# Patient Record
Sex: MALE | Race: WHITE | NOT HISPANIC OR LATINO | URBAN - METROPOLITAN AREA
[De-identification: names, ages, dates, MRNs, and addresses within clinical notes are randomized per-mention and may not be internally consistent; named-entity substitution may affect disease eponyms.]

---

## 2020-07-25 ENCOUNTER — TELEPHONE ENCOUNTER (OUTPATIENT)
Dept: URBAN - METROPOLITAN AREA CLINIC 13 | Facility: CLINIC | Age: 75
End: 2020-07-25

## 2020-07-25 RX ORDER — POLYETHYLENE GLYCOL 3350, SODIUM CHLORIDE, SODIUM BICARBONATE AND POTASSIUM CHLORIDE WITH LEMON FLAVOR 420; 11.2; 5.72; 1.48 G/4L; G/4L; G/4L; G/4L
TAKE 1/2 GALLON AT 5:00 PM DAY BEFORE PROCEDURE, TAKE SECOND 1/2 OF GALLON 6 HRS PRIOR TO PROCEDURE POWDER, FOR SOLUTION ORAL
Qty: 1 | Refills: 0 | OUTPATIENT
Start: 2017-05-15 | End: 2017-06-07

## 2020-07-26 ENCOUNTER — TELEPHONE ENCOUNTER (OUTPATIENT)
Dept: URBAN - METROPOLITAN AREA CLINIC 13 | Facility: CLINIC | Age: 75
End: 2020-07-26

## 2020-07-26 RX ORDER — ATORVASTATIN CALCIUM 80 MG/1
TAKE 1 TABLET AT BEDTIME TABLET, FILM COATED ORAL
Refills: 0 | Status: ACTIVE | COMMUNITY
Start: 2017-05-08

## 2020-07-26 RX ORDER — MELOXICAM 15 MG/1
TABLET ORAL
Qty: 30 | Refills: 0 | Status: ACTIVE | COMMUNITY
Start: 2017-01-06

## 2020-07-26 RX ORDER — ASPIRIN 81 MG/1
TAKE 1 TABLET DAILY TABLET ORAL
Refills: 0 | Status: ACTIVE | COMMUNITY
Start: 2017-05-08

## 2020-07-26 RX ORDER — CLOPIDOGREL 75 MG/1
TAKE 1 TABLET DAILY TABLET ORAL
Refills: 0 | Status: ACTIVE | COMMUNITY
Start: 2017-05-08

## 2020-07-26 RX ORDER — NAPROXEN 500 MG/1
TABLET ORAL
Qty: 60 | Refills: 0 | Status: ACTIVE | COMMUNITY
Start: 2016-07-16

## 2020-07-26 RX ORDER — DEXAMETHASONE 4 MG/1
TABLET ORAL
Qty: 14 | Refills: 0 | Status: ACTIVE | COMMUNITY
Start: 2017-04-10

## 2020-07-26 RX ORDER — FUROSEMIDE 20 MG/1
TABLET ORAL
Qty: 30 | Refills: 0 | Status: ACTIVE | COMMUNITY
Start: 2016-10-01

## 2020-07-26 RX ORDER — CEPHALEXIN 500 MG/1
CAPSULE ORAL
Qty: 40 | Refills: 0 | Status: ACTIVE | COMMUNITY
Start: 2017-01-04

## 2020-07-26 RX ORDER — MONTELUKAST SODIUM 10 MG/1
TAKE 1 TABLET AT BEDTIME TABLET, FILM COATED ORAL
Refills: 0 | Status: ACTIVE | COMMUNITY
Start: 2017-05-08

## 2020-07-26 RX ORDER — DUREZOL 0.5 MG/ML
EMULSION OPHTHALMIC
Qty: 5 | Refills: 0 | Status: ACTIVE | COMMUNITY
Start: 2016-11-11

## 2020-07-26 RX ORDER — CLINDAMYCIN HYDROCHLORIDE 300 MG/1
CAPSULE ORAL
Qty: 30 | Refills: 0 | Status: ACTIVE | COMMUNITY
Start: 2017-02-13

## 2020-07-26 RX ORDER — MULTIVITAMIN
TAKE 1 TABLET DAILY TABLET ORAL
Refills: 0 | Status: ACTIVE | COMMUNITY
Start: 2017-05-08

## 2020-07-26 RX ORDER — OMEGA-3S/DHA/EPA/FISH OIL/D3 300MG-1000
TAKE 1 CAPSULE DAILY CAPSULE ORAL
Refills: 0 | Status: ACTIVE | COMMUNITY
Start: 2017-05-08

## 2020-07-26 RX ORDER — DICLOFENAC SODIUM 75 MG/1
TABLET, DELAYED RELEASE ORAL
Qty: 60 | Refills: 0 | Status: ACTIVE | COMMUNITY
Start: 2016-06-14

## 2020-07-26 RX ORDER — CYCLOBENZAPRINE HYDROCHLORIDE 5 MG/1
TABLET, FILM COATED ORAL
Qty: 10 | Refills: 0 | Status: ACTIVE | COMMUNITY
Start: 2017-04-10

## 2020-07-26 RX ORDER — NITROGLYCERIN 0.4 MG/1
DISSOLVE 1 TABLET UNDER THE TONGUE AS NEEDED FOR CHEST PAIN TABLET SUBLINGUAL
Refills: 0 | Status: ACTIVE | COMMUNITY
Start: 2017-05-08

## 2020-07-26 RX ORDER — LOSARTAN POTASSIUM 50 MG/1
TAKE 1 TABLET DAILY TABLET, FILM COATED ORAL
Refills: 0 | Status: ACTIVE | COMMUNITY
Start: 2017-05-08

## 2020-07-26 RX ORDER — HYDROCODONE BITARTRATE AND ACETAMINOPHEN 10; 325 MG/1; MG/1
TABLET ORAL
Qty: 51 | Refills: 0 | Status: ACTIVE | COMMUNITY
Start: 2016-08-26

## 2020-07-26 RX ORDER — GUAIFENESIN 600 MG/1
TAKE 1 TABLET TWICE DAILY AS NEEDED TABLET, EXTENDED RELEASE ORAL
Refills: 0 | Status: ACTIVE | COMMUNITY
Start: 2017-05-08

## 2020-07-26 RX ORDER — BESIFLOXACIN HCL 0.6 %
SUSPENSION, DROPS(FINAL DOSAGE FORM)(ML) OPHTHALMIC (EYE)
Qty: 5 | Refills: 0 | Status: ACTIVE | COMMUNITY
Start: 2016-11-10

## 2022-06-23 ENCOUNTER — DASHBOARD ENCOUNTERS (OUTPATIENT)
Age: 77
End: 2022-06-23

## 2022-06-23 ENCOUNTER — WEB ENCOUNTER (OUTPATIENT)
Dept: URBAN - METROPOLITAN AREA CLINIC 113 | Facility: CLINIC | Age: 77
End: 2022-06-23

## 2022-06-23 ENCOUNTER — OFFICE VISIT (OUTPATIENT)
Dept: URBAN - METROPOLITAN AREA CLINIC 113 | Facility: CLINIC | Age: 77
End: 2022-06-23
Payer: MEDICARE

## 2022-06-23 VITALS — HEIGHT: 72 IN | RESPIRATION RATE: 20 BRPM | WEIGHT: 184 LBS | TEMPERATURE: 97.8 F | BODY MASS INDEX: 24.92 KG/M2

## 2022-06-23 DIAGNOSIS — R10.31 RIGHT INGUINAL PAIN: ICD-10-CM

## 2022-06-23 DIAGNOSIS — Z86.010 HISTORY OF COLON POLYPS: ICD-10-CM

## 2022-06-23 PROCEDURE — 99203 OFFICE O/P NEW LOW 30 MIN: CPT

## 2022-06-23 RX ORDER — CEPHALEXIN 500 MG/1
CAPSULE ORAL
Qty: 40 | Refills: 0 | Status: ON HOLD | COMMUNITY
Start: 2017-01-04

## 2022-06-23 RX ORDER — CLINDAMYCIN HYDROCHLORIDE 300 MG/1
CAPSULE ORAL
Qty: 30 | Refills: 0 | Status: ON HOLD | COMMUNITY
Start: 2017-02-13

## 2022-06-23 RX ORDER — DUREZOL 0.5 MG/ML
EMULSION OPHTHALMIC
Qty: 5 | Refills: 0 | Status: ON HOLD | COMMUNITY
Start: 2016-11-11

## 2022-06-23 RX ORDER — DEXAMETHASONE 4 MG/1
TABLET ORAL
Qty: 14 | Refills: 0 | Status: ON HOLD | COMMUNITY
Start: 2017-04-10

## 2022-06-23 RX ORDER — BESIFLOXACIN HCL 0.6 %
SUSPENSION, DROPS(FINAL DOSAGE FORM)(ML) OPHTHALMIC (EYE)
Qty: 5 | Refills: 0 | Status: ON HOLD | COMMUNITY
Start: 2016-11-10

## 2022-06-23 RX ORDER — GUAIFENESIN 600 MG/1
TAKE 1 TABLET TWICE DAILY AS NEEDED TABLET, EXTENDED RELEASE ORAL
Refills: 0 | Status: ON HOLD | COMMUNITY
Start: 2017-05-08

## 2022-06-23 RX ORDER — OMEGA-3S/DHA/EPA/FISH OIL/D3 300MG-1000
TAKE 1 CAPSULE DAILY CAPSULE ORAL
Refills: 0 | Status: ACTIVE | COMMUNITY
Start: 2017-05-08

## 2022-06-23 RX ORDER — ATORVASTATIN CALCIUM 80 MG/1
TAKE 1 TABLET AT BEDTIME TABLET, FILM COATED ORAL
Refills: 0 | Status: ACTIVE | COMMUNITY
Start: 2017-05-08

## 2022-06-23 RX ORDER — LOSARTAN POTASSIUM 50 MG/1
TAKE 1 TABLET DAILY TABLET, FILM COATED ORAL
Refills: 0 | Status: ACTIVE | COMMUNITY
Start: 2017-05-08

## 2022-06-23 RX ORDER — NITROGLYCERIN 0.4 MG/1
AS DIRECTED TABLET SUBLINGUAL
Status: ACTIVE | COMMUNITY

## 2022-06-23 RX ORDER — MELOXICAM 15 MG/1
TABLET ORAL
Qty: 30 | Refills: 0 | Status: ON HOLD | COMMUNITY
Start: 2017-01-06

## 2022-06-23 RX ORDER — NITROGLYCERIN 0.4 MG/1
DISSOLVE 1 TABLET UNDER THE TONGUE AS NEEDED FOR CHEST PAIN TABLET SUBLINGUAL
Refills: 0 | Status: ON HOLD | COMMUNITY
Start: 2017-05-08

## 2022-06-23 RX ORDER — HYDROCODONE BITARTRATE AND ACETAMINOPHEN 10; 325 MG/1; MG/1
TABLET ORAL
Qty: 51 | Refills: 0 | Status: ON HOLD | COMMUNITY
Start: 2016-08-26

## 2022-06-23 RX ORDER — CLOPIDOGREL 75 MG/1
TAKE 1 TABLET DAILY TABLET ORAL
Refills: 0 | Status: ON HOLD | COMMUNITY
Start: 2017-05-08

## 2022-06-23 RX ORDER — MOMETASONE FUROATE 1 MG/G
1 APPLICATION CREAM TOPICAL ONCE A DAY
Status: ACTIVE | COMMUNITY

## 2022-06-23 RX ORDER — CHOLECALCIFEROL (VITAMIN D3) 50 MCG
1 TABLET TABLET ORAL ONCE A DAY
Status: ACTIVE | COMMUNITY

## 2022-06-23 RX ORDER — NAPROXEN 500 MG/1
TABLET ORAL
Qty: 60 | Refills: 0 | Status: ON HOLD | COMMUNITY
Start: 2016-07-16

## 2022-06-23 RX ORDER — FUROSEMIDE 20 MG/1
TABLET ORAL
Qty: 30 | Refills: 0 | Status: ON HOLD | COMMUNITY
Start: 2016-10-01

## 2022-06-23 RX ORDER — ASPIRIN 81 MG/1
TAKE 1 TABLET DAILY TABLET ORAL
Refills: 0 | Status: ACTIVE | COMMUNITY
Start: 2017-05-08

## 2022-06-23 RX ORDER — GLUCOSAMINE/D3/BOSWELLIA SERRA 1500MG-400
AS DIRECTED TABLET ORAL
Status: ACTIVE | COMMUNITY

## 2022-06-23 RX ORDER — PETROLATUM,WHITE/LANOLIN
AS DIRECTED OINTMENT (GRAM) TOPICAL
Status: ACTIVE | COMMUNITY

## 2022-06-23 RX ORDER — DICLOFENAC SODIUM 75 MG/1
TABLET, DELAYED RELEASE ORAL
Qty: 60 | Refills: 0 | Status: ON HOLD | COMMUNITY
Start: 2016-06-14

## 2022-06-23 RX ORDER — MULTIVITAMIN
TAKE 1 TABLET DAILY TABLET ORAL
Refills: 0 | Status: ACTIVE | COMMUNITY
Start: 2017-05-08

## 2022-06-23 RX ORDER — ACETAMINOPHEN 650 MG
1 TABLETS AS NEEDED TABLET, EXTENDED RELEASE ORAL
Status: ACTIVE | COMMUNITY

## 2022-06-23 RX ORDER — MONTELUKAST SODIUM 10 MG/1
TAKE 1 TABLET AT BEDTIME TABLET, FILM COATED ORAL
Refills: 0 | Status: ON HOLD | COMMUNITY
Start: 2017-05-08

## 2022-06-23 RX ORDER — POLYETHYLENE GLYCOL 3350, SODIUM CHLORIDE, SODIUM BICARBONATE, POTASSIUM CHLORIDE 420; 11.2; 5.72; 1.48 G/4L; G/4L; G/4L; G/4L
420 G POWDER, FOR SOLUTION ORAL ONCE
Qty: 420 G | Refills: 0 | OUTPATIENT
Start: 2022-06-23 | End: 2022-06-24

## 2022-06-23 RX ORDER — CYCLOBENZAPRINE HYDROCHLORIDE 5 MG/1
TABLET, FILM COATED ORAL
Qty: 10 | Refills: 0 | Status: ON HOLD | COMMUNITY
Start: 2017-04-10

## 2022-06-23 NOTE — HPI-TODAY'S VISIT:
77-year-old male with a history of hypertension, atherosclerotic heart disease s/p 4 stent placements, dyslipidemia and colon polyps is referred by Dr. Navjot Bautista for colon cancer screening.  A copy of today's visit will be forwarded to the referring provider.  Labs 4/29/2022:Elevated glucose 102, low creatinine 0.65, normal LFTs, normal hemoglobin A1c 5.6, normal CBC.  He states he is very proactive with his health. He follows with his PCP and cardiologist every 6 months. He takes MiraLAX every night for bowel maintenance. He has a regular BM every day without blood per rectum or melena. Denies heartburn or dysphagia. No abdominal pain, nausea or vomiting.  He has seen Dr. Granados who has treated him for bilateral testicular cysts.  He believes he has a right inguinal hernia. He will have pain in his right groin and occasional swelling of his right testicle. He was evaluated by Dr. Yousif who did not believe there was a hernia. He had a CT scan of his pelvis which did not reveal a hernia. He has seen Dr. Bautista since then who also agreed there was no hernia.   He states his son was seen by Dr. Phillips in the hospital for hematemesis. HE was found to have no alcoholic cirrhosis and severe esophageal varices.  His cardiologist is Dr. Al Jones. He has not had any cardiac events since his last stent placement in 2013. He is no longer on Plavix, only on aspirin every day. He is now on a yearly follow up.

## 2022-06-23 NOTE — HPI-OTHER HISTORIES
Colonoscopy 6/7/2017: Ileum was normal. Removal of five 2-6 mm polyps in the rectum, ascending colon and cecum. Diverticulosis in the sigmoid colon. Pathology revealed sessile serrate adenomas and hyperplastic polyps. Repeat colonoscopy in 5 years for surveillance.

## 2022-08-03 ENCOUNTER — CLAIMS CREATED FROM THE CLAIM WINDOW (OUTPATIENT)
Dept: URBAN - METROPOLITAN AREA CLINIC 4 | Facility: CLINIC | Age: 77
End: 2022-08-03
Payer: MEDICARE

## 2022-08-03 ENCOUNTER — OFFICE VISIT (OUTPATIENT)
Dept: URBAN - METROPOLITAN AREA SURGERY CENTER 25 | Facility: SURGERY CENTER | Age: 77
End: 2022-08-03
Payer: MEDICARE

## 2022-08-03 DIAGNOSIS — D12.2 BENIGN NEOPLASM OF ASCENDING COLON: ICD-10-CM

## 2022-08-03 DIAGNOSIS — D12.4 BENIGN NEOPLASM OF DESCENDING COLON: ICD-10-CM

## 2022-08-03 DIAGNOSIS — D12.2 ADENOMA OF ASCENDING COLON: ICD-10-CM

## 2022-08-03 DIAGNOSIS — D12.4 ADENOMA OF DESCENDING COLON: ICD-10-CM

## 2022-08-03 DIAGNOSIS — K63.89 OTHER SPECIFIED DISEASES OF INTESTINE: ICD-10-CM

## 2022-08-03 DIAGNOSIS — Z86.010 HISTORY OF COLON POLYPS: ICD-10-CM

## 2022-08-03 PROCEDURE — 45385 COLONOSCOPY W/LESION REMOVAL: CPT | Performed by: INTERNAL MEDICINE

## 2022-08-03 PROCEDURE — 88305 TISSUE EXAM BY PATHOLOGIST: CPT | Performed by: PATHOLOGY

## 2022-08-03 PROCEDURE — G8907 PT DOC NO EVENTS ON DISCHARG: HCPCS | Performed by: INTERNAL MEDICINE

## 2022-08-03 PROCEDURE — 45380 COLONOSCOPY AND BIOPSY: CPT | Performed by: INTERNAL MEDICINE

## 2022-08-03 RX ORDER — MOMETASONE FUROATE 1 MG/G
1 APPLICATION CREAM TOPICAL ONCE A DAY
Status: ACTIVE | COMMUNITY

## 2022-08-03 RX ORDER — CLOPIDOGREL 75 MG/1
TAKE 1 TABLET DAILY TABLET ORAL
Refills: 0 | Status: ON HOLD | COMMUNITY
Start: 2017-05-08

## 2022-08-03 RX ORDER — ACETAMINOPHEN 650 MG
1 TABLETS AS NEEDED TABLET, EXTENDED RELEASE ORAL
Status: ACTIVE | COMMUNITY

## 2022-08-03 RX ORDER — MONTELUKAST SODIUM 10 MG/1
TAKE 1 TABLET AT BEDTIME TABLET, FILM COATED ORAL
Refills: 0 | Status: ON HOLD | COMMUNITY
Start: 2017-05-08

## 2022-08-03 RX ORDER — GUAIFENESIN 600 MG/1
TAKE 1 TABLET TWICE DAILY AS NEEDED TABLET, EXTENDED RELEASE ORAL
Refills: 0 | Status: ON HOLD | COMMUNITY
Start: 2017-05-08

## 2022-08-03 RX ORDER — GLUCOSAMINE/D3/BOSWELLIA SERRA 1500MG-400
AS DIRECTED TABLET ORAL
Status: ACTIVE | COMMUNITY

## 2022-08-03 RX ORDER — CEPHALEXIN 500 MG/1
CAPSULE ORAL
Qty: 40 | Refills: 0 | Status: ON HOLD | COMMUNITY
Start: 2017-01-04

## 2022-08-03 RX ORDER — NITROGLYCERIN 0.4 MG/1
AS DIRECTED TABLET SUBLINGUAL
Status: ACTIVE | COMMUNITY

## 2022-08-03 RX ORDER — CYCLOBENZAPRINE HYDROCHLORIDE 5 MG/1
TABLET, FILM COATED ORAL
Qty: 10 | Refills: 0 | Status: ON HOLD | COMMUNITY
Start: 2017-04-10

## 2022-08-03 RX ORDER — DUREZOL 0.5 MG/ML
EMULSION OPHTHALMIC
Qty: 5 | Refills: 0 | Status: ON HOLD | COMMUNITY
Start: 2016-11-11

## 2022-08-03 RX ORDER — NITROGLYCERIN 0.4 MG/1
DISSOLVE 1 TABLET UNDER THE TONGUE AS NEEDED FOR CHEST PAIN TABLET SUBLINGUAL
Refills: 0 | Status: ON HOLD | COMMUNITY
Start: 2017-05-08

## 2022-08-03 RX ORDER — MELOXICAM 15 MG/1
TABLET ORAL
Qty: 30 | Refills: 0 | Status: ON HOLD | COMMUNITY
Start: 2017-01-06

## 2022-08-03 RX ORDER — DICLOFENAC SODIUM 75 MG/1
TABLET, DELAYED RELEASE ORAL
Qty: 60 | Refills: 0 | Status: ON HOLD | COMMUNITY
Start: 2016-06-14

## 2022-08-03 RX ORDER — PETROLATUM,WHITE/LANOLIN
AS DIRECTED OINTMENT (GRAM) TOPICAL
Status: ACTIVE | COMMUNITY

## 2022-08-03 RX ORDER — DEXAMETHASONE 4 MG/1
TABLET ORAL
Qty: 14 | Refills: 0 | Status: ON HOLD | COMMUNITY
Start: 2017-04-10

## 2022-08-03 RX ORDER — LOSARTAN POTASSIUM 50 MG/1
TAKE 1 TABLET DAILY TABLET, FILM COATED ORAL
Refills: 0 | Status: ACTIVE | COMMUNITY
Start: 2017-05-08

## 2022-08-03 RX ORDER — HYDROCODONE BITARTRATE AND ACETAMINOPHEN 10; 325 MG/1; MG/1
TABLET ORAL
Qty: 51 | Refills: 0 | Status: ON HOLD | COMMUNITY
Start: 2016-08-26

## 2022-08-03 RX ORDER — ATORVASTATIN CALCIUM 80 MG/1
TAKE 1 TABLET AT BEDTIME TABLET, FILM COATED ORAL
Refills: 0 | Status: ACTIVE | COMMUNITY
Start: 2017-05-08

## 2022-08-03 RX ORDER — CHOLECALCIFEROL (VITAMIN D3) 50 MCG
1 TABLET TABLET ORAL ONCE A DAY
Status: ACTIVE | COMMUNITY

## 2022-08-03 RX ORDER — CLINDAMYCIN HYDROCHLORIDE 300 MG/1
CAPSULE ORAL
Qty: 30 | Refills: 0 | Status: ON HOLD | COMMUNITY
Start: 2017-02-13

## 2022-08-03 RX ORDER — BESIFLOXACIN HCL 0.6 %
SUSPENSION, DROPS(FINAL DOSAGE FORM)(ML) OPHTHALMIC (EYE)
Qty: 5 | Refills: 0 | Status: ON HOLD | COMMUNITY
Start: 2016-11-10

## 2022-08-03 RX ORDER — NAPROXEN 500 MG/1
TABLET ORAL
Qty: 60 | Refills: 0 | Status: ON HOLD | COMMUNITY
Start: 2016-07-16

## 2022-08-03 RX ORDER — OMEGA-3S/DHA/EPA/FISH OIL/D3 300MG-1000
TAKE 1 CAPSULE DAILY CAPSULE ORAL
Refills: 0 | Status: ACTIVE | COMMUNITY
Start: 2017-05-08

## 2022-08-03 RX ORDER — MULTIVITAMIN
TAKE 1 TABLET DAILY TABLET ORAL
Refills: 0 | Status: ACTIVE | COMMUNITY
Start: 2017-05-08

## 2022-08-03 RX ORDER — FUROSEMIDE 20 MG/1
TABLET ORAL
Qty: 30 | Refills: 0 | Status: ON HOLD | COMMUNITY
Start: 2016-10-01

## 2022-08-03 RX ORDER — ASPIRIN 81 MG/1
TAKE 1 TABLET DAILY TABLET ORAL
Refills: 0 | Status: ACTIVE | COMMUNITY
Start: 2017-05-08

## 2022-08-11 PROBLEM — 428283002: Status: ACTIVE | Noted: 2022-06-23

## 2022-10-26 ENCOUNTER — TELEPHONE ENCOUNTER (OUTPATIENT)
Dept: URBAN - METROPOLITAN AREA CLINIC 113 | Facility: CLINIC | Age: 77
End: 2022-10-26